# Patient Record
Sex: MALE | Race: WHITE | NOT HISPANIC OR LATINO | URBAN - METROPOLITAN AREA
[De-identification: names, ages, dates, MRNs, and addresses within clinical notes are randomized per-mention and may not be internally consistent; named-entity substitution may affect disease eponyms.]

---

## 2023-11-10 ENCOUNTER — CONSULT (OUTPATIENT)
Dept: PLASTIC SURGERY | Facility: HOSPITAL | Age: 38
End: 2023-11-10
Payer: COMMERCIAL

## 2023-11-10 VITALS
DIASTOLIC BLOOD PRESSURE: 100 MMHG | SYSTOLIC BLOOD PRESSURE: 145 MMHG | TEMPERATURE: 98.4 F | WEIGHT: 210 LBS | HEART RATE: 63 BPM

## 2023-11-10 DIAGNOSIS — R22.0 MASS OF SCALP: Primary | ICD-10-CM

## 2023-11-10 PROCEDURE — 99244 OFF/OP CNSLTJ NEW/EST MOD 40: CPT | Performed by: SURGERY

## 2023-11-10 NOTE — PROGRESS NOTES
Assessment/Plan: Please see HPI. We discussed excision of the subcutaneous masses of the scalp (presumed pilar cyst). He feels comfortable having this performed with local anesthesia. We discussed the potential risks, complications, and limitations to include infection, bleeding, scarring, asymmetry, contour deformity, residual/recurrent masses, need for additional surgery, etc.  His questions were answered to his satisfaction and consent was obtained. He will work with our coordinator to arrange a date for the procedure. There are no diagnoses linked to this encounter. Subjective: Subcutaneous masses of scalp x2     Patient ID: Vesta Lang is a 43 y.o. male. HPI Celestine Ruano is a pleasant 67-year-old male, referred by his PCP, for treatment of subcutaneous masses of the scalp. These have been present for years, are slowly enlarging and he is interested in having them excised. Has had previous similar masses excised elsewhere in the past.    The following portions of the patient's history were reviewed and updated as appropriate: allergies, current medications, past family history, past medical history, past social history, past surgical history, and problem list.    Review of Systems   Constitutional:  Negative for chills and fever. HENT:  Negative for hearing loss. Eyes:  Positive for visual disturbance. Negative for discharge. Respiratory:  Negative for chest tightness and shortness of breath. Cardiovascular:  Negative for chest pain and leg swelling. Gastrointestinal:  Negative for blood in stool, constipation, diarrhea and nausea. Genitourinary:  Negative for dysuria. Musculoskeletal:  Negative for gait problem. Skin:  Negative for rash. Allergic/Immunologic: Negative for immunocompromised state. Neurological:  Negative for seizures and headaches. Hematological:  Does not bruise/bleed easily. Psychiatric/Behavioral:  Negative for dysphoric mood.  The patient is not nervous/anxious. Objective:      /100   Pulse 63   Temp 98.4 °F (36.9 °C)   Wt 95.3 kg (210 lb)          Physical Exam  Constitutional:       Appearance: Normal appearance. HENT:      Head: Normocephalic. Comments: Subcutaneous mass right mastoid/temporal scalp, approximately 2 cm, 2.5 cm mass left parietal scalp, see photo in media  Eyes:      Extraocular Movements: Extraocular movements intact. Pupils: Pupils are equal, round, and reactive to light. Cardiovascular:      Rate and Rhythm: Normal rate. Pulmonary:      Effort: Pulmonary effort is normal.   Abdominal:      Palpations: Abdomen is soft. Musculoskeletal:         General: Normal range of motion. Cervical back: Normal range of motion and neck supple. Skin:     General: Skin is warm. Neurological:      Mental Status: He is alert and oriented to person, place, and time.    Psychiatric:         Mood and Affect: Mood normal.

## 2023-11-17 ENCOUNTER — TELEPHONE (OUTPATIENT)
Dept: PLASTIC SURGERY | Facility: CLINIC | Age: 38
End: 2023-11-17

## 2023-12-18 ENCOUNTER — PREP FOR PROCEDURE (OUTPATIENT)
Dept: PLASTIC SURGERY | Facility: CLINIC | Age: 38
End: 2023-12-18

## 2023-12-18 DIAGNOSIS — R22.9 SUBCUTANEOUS MASS: Primary | ICD-10-CM

## 2024-01-02 PROCEDURE — NC001 PR NO CHARGE: Performed by: PHYSICIAN ASSISTANT

## 2024-01-02 NOTE — H&P
Assessment/Plan: Please see HPI.  We discussed excision of the subcutaneous masses of the scalp (presumed pilar cyst).  He feels comfortable having this performed with local anesthesia.  We discussed the potential risks, complications, and limitations to include infection, bleeding, scarring, asymmetry, contour deformity, residual/recurrent masses, need for additional surgery, etc.  His questions were answered to his satisfaction and consent was obtained.  He will work with our coordinator to arrange a date for the procedure.           There are no diagnoses linked to this encounter.      Subjective: Subcutaneous masses of scalp x2     Patient ID: Trent Recinos is a 38 y.o. male.    HPI Trent is a pleasant 38-year-old male, referred by his PCP, for treatment of subcutaneous masses of the scalp.  These have been present for years, are slowly enlarging and he is interested in having them excised.  Has had previous similar masses excised elsewhere in the past.    The following portions of the patient's history were reviewed and updated as appropriate: allergies, current medications, past family history, past medical history, past social history, past surgical history, and problem list.    Review of Systems   Constitutional:  Negative for chills and fever.   HENT:  Negative for hearing loss.    Eyes:  Positive for visual disturbance. Negative for discharge.   Respiratory:  Negative for chest tightness and shortness of breath.    Cardiovascular:  Negative for chest pain and leg swelling.   Gastrointestinal:  Negative for blood in stool, constipation, diarrhea and nausea.   Genitourinary:  Negative for dysuria.   Musculoskeletal:  Negative for gait problem.   Skin:  Negative for rash.   Allergic/Immunologic: Negative for immunocompromised state.   Neurological:  Negative for seizures and headaches.   Hematological:  Does not bruise/bleed easily.   Psychiatric/Behavioral:  Negative for dysphoric mood. The patient is not  nervous/anxious.          Objective:      There were no vitals taken for this visit.         Physical Exam  Constitutional:       Appearance: Normal appearance.   HENT:      Head: Normocephalic.      Comments: Subcutaneous mass right mastoid/temporal scalp, approximately 2 cm, 2.5 cm mass left parietal scalp, see photo in media  Eyes:      Extraocular Movements: Extraocular movements intact.      Pupils: Pupils are equal, round, and reactive to light.   Cardiovascular:      Rate and Rhythm: Normal rate.   Pulmonary:      Effort: Pulmonary effort is normal.   Abdominal:      Palpations: Abdomen is soft.   Musculoskeletal:         General: Normal range of motion.      Cervical back: Normal range of motion and neck supple.   Skin:     General: Skin is warm.   Neurological:      Mental Status: He is alert and oriented to person, place, and time.   Psychiatric:         Mood and Affect: Mood normal.

## 2024-02-08 ENCOUNTER — HOSPITAL ENCOUNTER (OUTPATIENT)
Facility: HOSPITAL | Age: 39
Setting detail: OUTPATIENT SURGERY
Discharge: HOME | End: 2024-02-08
Attending: SURGERY | Admitting: SURGERY
Payer: COMMERCIAL

## 2024-02-08 VITALS
WEIGHT: 205.03 LBS | SYSTOLIC BLOOD PRESSURE: 146 MMHG | RESPIRATION RATE: 16 BRPM | BODY MASS INDEX: 30.37 KG/M2 | DIASTOLIC BLOOD PRESSURE: 87 MMHG | HEIGHT: 69 IN | OXYGEN SATURATION: 97 % | HEART RATE: 68 BPM | TEMPERATURE: 97.3 F

## 2024-02-08 DIAGNOSIS — R22.9 SUBCUTANEOUS MASS: ICD-10-CM

## 2024-02-08 PROCEDURE — 12032 INTMD RPR S/A/T/EXT 2.6-7.5: CPT | Performed by: SURGERY

## 2024-02-08 PROCEDURE — 11422 EXC H-F-NK-SP B9+MARG 1.1-2: CPT | Performed by: PHYSICIAN ASSISTANT

## 2024-02-08 PROCEDURE — 12032 INTMD RPR S/A/T/EXT 2.6-7.5: CPT | Performed by: PHYSICIAN ASSISTANT

## 2024-02-08 PROCEDURE — 11423 EXC H-F-NK-SP B9+MARG 2.1-3: CPT | Performed by: SURGERY

## 2024-02-08 PROCEDURE — 88304 TISSUE EXAM BY PATHOLOGIST: CPT | Performed by: PATHOLOGY

## 2024-02-08 PROCEDURE — 11423 EXC H-F-NK-SP B9+MARG 2.1-3: CPT | Performed by: PHYSICIAN ASSISTANT

## 2024-02-08 PROCEDURE — 11422 EXC H-F-NK-SP B9+MARG 1.1-2: CPT | Performed by: SURGERY

## 2024-02-08 RX ORDER — GINSENG 100 MG
CAPSULE ORAL AS NEEDED
Status: DISCONTINUED | OUTPATIENT
Start: 2024-02-08 | End: 2024-02-08 | Stop reason: HOSPADM

## 2024-02-08 NOTE — OP NOTE
OPERATIVE REPORT  PATIENT NAME: Trent Recinos    :  1985  MRN: [id number]  Pt Location: [facility] OR ROOM 03    SURGERY DATE: 2024    Surgeons and Role:     * Murray Miller MD - Primary     * Mona Cabezas PA-C - Assisting    Preop Diagnosis:  Masses of scalp x 2    Postop diagnosis: Subfascial masses of scalp x 2  Procedure(s):  #1 excision subfascial mass of the anterior scalp 2.2 cm #2 intermediate/layered closure anterior scalp 2 cm #3 excision subfascial mass of posterior scalp 1.6 cm #4 intermediate/layered closure posterior scalp 2 cm    Specimen(s):  ID Type Source Tests Collected by Time Destination   1 : subcutaneous mass anterior scalp Tissue Head TISSUE EXAM Murray Miller MD 2024 0740    2 : subcutaneous mass posterior scalp Tissue Head TISSUE EXAM Murray Miller MD 2024 0741        Estimated Blood Loss:   Minimal    Drains:  * No LDAs found *    Anesthesia Type:   Local    Operative Indications:  Subcutaneous mass [R22.9]      Operative Findings:  Subfascial masses of scalp x 2    Complications:   None    Procedure and Technique:  Trent was seen preoperatively in the holding area, the surgical sites were marked with his participation.  I reviewed with him the planned procedure, potential risks, complications and limitations.  He was taken to the operating room, the surgical field was prepped and draped in sterile fashion and a proper timeout was performed.  2.5 loupe magnification was used to aid in visualization.  At both sites, local anesthesia was administered.  Starting with the anterior scalp lesion with skin incision was created with a 15 blade, carried down through the dermis, and this incision was created parallel to the hair follicles.  Dissection then proceeded through the subcutaneous tissue down through the superficial fascia/galea.  Galea was opened with the Bovie cautery revealing a subfascial mass consistent with a pilar cyst.  This was extracted in a  piecemeal fashion in order to minimize the length of the incision/scar.  It was placed in formalin to be sent to pathology.  The wound was then copiously irrigated was assured with the Bovie cautery.  Layered closure was then undertaken utilizing 5-0 Monocryl sutures.  At the level of the deep brain was followed by interrupted 4-0 Prolene skin repair.  An identical procedure was performed to the posterior scalp lesion.  The wounds were then closed dressed with bacitracin and Xeroform and the patient was transferred to the recovery area.   I was present for the entire procedure. and A qualified resident physician was not available.  The physician assistant provided assistance with retraction exposure hemostasis and wound closure.    Patient Disposition:  PACU           SIGNATURE: Murray Miller MD  DATE: February 8, 2024  TIME: 8:32 AM

## 2024-02-08 NOTE — DISCHARGE INSTR - AVS FIRST PAGE
[facility]  Dr. DENIZ Miller Jr.  [address] [place] [address]  Phone: [phone]     Postoperative Instructions for Outpatient Surgery     These instructions are being provided by your doctor to give you basic guidelines during your post-op recovery. Please let our office know if your contact information has changed.      Please call the office today for an appointment in 14 days for postoperative care     Dressings: Leave yellow dressing in place for 24-36hrs, then remove     Activity Restrictions: No strenuous activity     Bathing: You can shower in 36hrs     Medications:    Resume pre-op medications.   You may take tylenol, aleve, or ibuprofen for pain control             Apply bacitracin to each area 4 times a day for 4 days

## 2024-02-12 PROCEDURE — 88304 TISSUE EXAM BY PATHOLOGIST: CPT | Performed by: PATHOLOGY

## 2024-02-23 ENCOUNTER — OFFICE VISIT (OUTPATIENT)
Dept: PLASTIC SURGERY | Facility: CLINIC | Age: 39
End: 2024-02-23

## 2024-02-23 DIAGNOSIS — L72.11 PILAR CYST OF SCALP: Primary | ICD-10-CM

## 2024-02-23 NOTE — PROGRESS NOTES
Assessment/Plan:     Diagnoses and all orders for this visit:    Pilar cyst of scalp  See HPI. Incisions are C/D/I. Sutures removed. We will see him back in 4-6 wks.         Subjective:      Patient ID: Trent Recinos is a 38 y.o. male.    HPI    Pt is here for a post-op visit. He underwent excision subfascial mass of the anterior scalp, intermediate/ layered closure anterior scalp, excision subfascial mass of posterior scalp, intermediate/ layered closure posterior scalp on 2/8 by Dr. Miller. 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 Pt has no complaints.     Patient Active Problem List   Diagnosis    Subcutaneous mass     Allergies   Allergen Reactions    Keflex [Cephalexin] Rash     No current outpatient medications on file prior to visit.     No current facility-administered medications on file prior to visit.     No family history on file.  No past medical history on file.  Social History     Socioeconomic History    Marital status: /Civil Union     Spouse name: Not on file    Number of children: Not on file    Years of education: Not on file    Highest education level: Not on file   Occupational History    Not on file   Tobacco Use    Smoking status: Never     Passive exposure: Never    Smokeless tobacco: Never   Vaping Use    Vaping status: Never Used   Substance and Sexual Activity    Alcohol use: Never    Drug use: Never    Sexual activity: Not on file   Other Topics Concern    Not on file   Social History Narrative    Not on file     Social Determinants of Health     Financial Resource Strain: Not on file   Food Insecurity: Not on file   Transportation Needs: Not on file   Physical Activity: Not on file   Stress: Not on file   Social Connections: Not on file   Intimate Partner Violence: Not on file   Housing Stability: Not on file     Past Surgical History:   Procedure Laterality Date    MN EXC TUMOR SOFT TISS FACE&SCALP SUBFASCIAL <2CM N/A 2/8/2024    Procedure: EXCISION OF  SUBCUTANEOUS MASSES OF SCALP (X2);  Surgeon: Murray Miller MD;  Location: [facility] MAIN OR;  Service: Plastics         Review of Systems   All other systems reviewed and are negative.        Objective:      There were no vitals taken for this visit.         Physical Exam  Constitutional:       Appearance: Normal appearance. He is well-developed.   HENT:      Head: Normocephalic and atraumatic.      Comments: Incisions are C/D/I. Sutures removed.   Eyes:      Conjunctiva/sclera: Conjunctivae normal.   Pulmonary:      Effort: Pulmonary effort is normal.   Musculoskeletal:         General: Normal range of motion.      Cervical back: Normal range of motion.   Skin:     General: Skin is warm and dry.   Neurological:      Mental Status: He is alert and oriented to person, place, and time.   Psychiatric:         Mood and Affect: Mood normal.         Behavior: Behavior normal.

## (undated) DEVICE — INTENDED FOR TISSUE SEPARATION, AND OTHER PROCEDURES THAT REQUIRE A SHARP SURGICAL BLADE TO PUNCTURE OR CUT.: Brand: BARD-PARKER SAFETY BLADES SIZE 15, STERILE

## (undated) DEVICE — SUT MONOCRYL 5-0 P-3 18 IN Y493G

## (undated) DEVICE — NEPTUNE E-SEP SMOKE EVACUATION PENCIL, COATED, 70MM BLADE, PUSH BUTTON SWITCH: Brand: NEPTUNE E-SEP

## (undated) DEVICE — SKIN MARKER DUAL TIP WITH RULER CAP, FLEXIBLE RULER AND LABELS: Brand: DEVON

## (undated) DEVICE — DISPOSABLE OR TOWEL: Brand: CARDINAL HEALTH

## (undated) DEVICE — GLOVE SRG BIOGEL 7

## (undated) DEVICE — NEEDLE 27 G X 1 1/4

## (undated) DEVICE — INTENDED FOR TISSUE SEPARATION, AND OTHER PROCEDURES THAT REQUIRE A SHARP SURGICAL BLADE TO PUNCTURE OR CUT.: Brand: BARD-PARKER ® CARBON RIB-BACK BLADES

## (undated) DEVICE — TIBURON SPLIT SHEET: Brand: CONVERTORS

## (undated) DEVICE — ELECTRODE NEEDLE MEGAFINE 2IN E-Z CLEAN MEGADYNE -0118

## (undated) DEVICE — SUT PROLENE 4-0 PC-3 18 IN 8634G

## (undated) DEVICE — 3M™ STERI-STRIP™ REINFORCED ADHESIVE SKIN CLOSURES, R1547, 1/2 IN X 4 IN (12 MM X 100 MM), 6 STRIPS/ENVELOPE: Brand: 3M™ STERI-STRIP™

## (undated) DEVICE — VESSEL CANNULA

## (undated) DEVICE — PAD GROUNDING DUAL ADULT

## (undated) DEVICE — BETHLEHEM UNIVERSAL OUTPATIENT: Brand: CARDINAL HEALTH

## (undated) DEVICE — TUBING SUCTION 5MM X 12 FT